# Patient Record
Sex: FEMALE | Race: WHITE
[De-identification: names, ages, dates, MRNs, and addresses within clinical notes are randomized per-mention and may not be internally consistent; named-entity substitution may affect disease eponyms.]

---

## 2017-01-11 ENCOUNTER — HOSPITAL ENCOUNTER (OUTPATIENT)
Dept: HOSPITAL 80 - BRMIMAGING | Age: 72
End: 2017-01-11
Attending: GENERAL ACUTE CARE HOSPITAL
Payer: COMMERCIAL

## 2017-01-11 DIAGNOSIS — Z85.3: ICD-10-CM

## 2017-01-11 DIAGNOSIS — Z12.31: Primary | ICD-10-CM

## 2017-01-11 DIAGNOSIS — Z78.0: ICD-10-CM

## 2017-01-11 DIAGNOSIS — Z13.820: ICD-10-CM

## 2017-01-11 PROCEDURE — G0202 SCR MAMMO BI INCL CAD: HCPCS

## 2017-01-11 NOTE — DX
DEXA Bone Mineral Densitometry    

 

Clinical Indications: 71-year-old female who has a prior history of breast cancer. The patient went t
hrough menopause at age 47, and she takes supplementary vitamin D and exercises by walking and partic
ipating in yoga. Evaluate for estrogen deficiency, and screen for osteoporosis.

 

Comparison: DEXA scan, dated December 5, 2011.

 

Technique:  Bone Mineral Densitometry (BMD) by [employer] Energy X-Ray Absorptiometry (DEXA) was performed 
utilizing the [employer] scanner.  The lumbar spine was evaluated in the AP projection.  Both hips
 and the left forearm were evaluated in the AP projection. Vertebral fracture assessment was also per
formed. A FRAX score was calculated.

 

AP Lumbar Spine:  The L1, L2, and the L3 vertebral bodies were evaluated. The L4 level was excluded b
ecause of some endplate sclerosis.  

BMD:  1.220 gm/cm2 

T-score:  0.3 SD 

Z-score: 2.2 SD

There has been a statistically significant interval decrease in bone mineral density of 5.6% since th
e previous study.

 

AP Left Hip: Femoral Neck

BMD:  0.695 gm/cm2

T-score:  -2.5 SD 

Z-score:  -0.6 SD

There has been a statistically significant interval decrease in bone mineral density of 5.9% since th
e previous study.

 

AP Right Hip: Femoral Neck

BMD:  0.682 gm/cm2

T-score:  -2.6 SD* 

Z-score:  -0.7 SD

There has been a statistically significant interval decrease in bone mineral density of 4.6% since th
e previous study.

 

AP Left Forearm, 1/3:  

BMD:  0.689 gm/cm2

T-score:  -2.1 SD

Z-score:  -0.4 SD 

There has been no statistically significant interval change.

 

Vertebral Fracture Assessment: There is no new significant fracture deformity. There are old mild shawna
tral wedging from T12 to L4 with ventral traction osteophytes.

 

FRAX Score: The 10 year probability for any major osteoporotic fracture is 22.7%, and for a hip fract
ure is 7.8%.

 

Conclusion: Considering the lowest measured site, the patient is osteoporotic*. There have been stati
stically significant interval decreases in bone mineral density involving the lumbar spine and the hi
ps since 2011.

 

Any bone loss in this patient is probably related to aging or estrogen deficiency.

 

Recommendations: 

 

1. Consider excluding secondary metabolic causes of bone loss (reported to be present in as many as 3
0% of patients with normal Z scores). Basic laboratory evaluation might include blood chemistries (ca
lcium, phosphorus, alkaline phosphatase, liver function tests, creatinine, total protein), complete b
lood count,  serum 25-OH- vitamin D3 level,  24-hour urine calcium,  serum TSH and serum PTH. Targete
d laboratory testing based on individual patient circumstances might include serum electrophoresis (S
PEP or UPEP),  anti-tissue transglutaminase antibody levels (celiac disease) , serum bone specific al
kaline phosphatase, bone turnover markers (urine, serum) or fibroblast growth factor 23 (FGF 23)(eval
uate for unexplained osteomalacia). 

 

2. If secondary causes are excluded, then consider initiating treatment with a bisphosphonate (such a
s Fosamax, Actonel, or Boniva). If the patient is unable to use an oral bisphosphonate, another agent
 such as IV bisphosphonates (Boniva or Reclast), teriparatide (Forteo),  a selective estrogen recepto
r modulator (Evista), or denosumab ( anti RANKL monoclonal antibody) might be considered.

 

3. If antiresorptive therapy is initiated and if clinically indicated, consider obtaining a baseline 
and 3 month followup bone resorption marker (NTX, CTX, TRAP5b or Pyridinoline, deoxypyridinoline) to 
monitor the therapeutic effect.  

 

4. Supplementing an insufficient diet to achieve total intakes of 1500 mg calcium and 800 Internation
al Units of vitamin D daily should be considered. 

 

5. Osteoporosis prevention and treatment begins by modifying risk factors. The patient should be enco
uraged to continue participating in a regular exercise program that includes weightbearing and muscle
 strengthening regimens, as is clinically appropriate.

 

6. Recommend follow-up DEXA in one year to assess the efficacy of pharmacologic intervention and/or c
orrection of appropriate secondary cause(s).

## 2017-01-11 NOTE — MA
Screening Digital Mammogram

 

Clinical Indications: Routine screening. History of DCIS in the left breast.

 

Technique:  Standard cephalocaudal and mediolateral oblique projections are obtained.  This examinati
on was processed by the [employer] computer-aided detection system.

 

Comparison: Mammograms December 18, 2015, December 17, 2014, December 11, 2013, June 17, 2013, [facility]
er 13, 2012, June 29, 2012, January 4, 2012, December 12, 2011, December 5, 2011.

 

Breast density: Type C: The breast tissue is heterogeneously dense, which may obscure small masses.

 

Findings: CAD was reviewed.  No suspicious calcifications, masses, or areas of architectural distorti
on are identified.  Heterogeneously dense breast parenchyma reduces sensitivity for noncalcified mass
es.

 

Impression: Negative mammogram. BI-RADS 1.

 

Recommendation:  Routine screening is recommended in one year, as long as physical examination is leanna
ign in this patient with moderately dense breast parenchyma.

 

[facility] will send a result letter to the patient.

 

Negative mammography should not preclude additional workup of a clinically suspicious finding.

 

The patient's information is entered into a reminder system with a target due date for her next mammo
gram.

## 2018-01-12 ENCOUNTER — HOSPITAL ENCOUNTER (OUTPATIENT)
Dept: HOSPITAL 80 - BRMIMAGING | Age: 73
End: 2018-01-12
Attending: GENERAL ACUTE CARE HOSPITAL
Payer: COMMERCIAL

## 2018-01-12 DIAGNOSIS — Z85.3: ICD-10-CM

## 2018-01-12 DIAGNOSIS — Z12.31: Primary | ICD-10-CM

## 2019-01-14 ENCOUNTER — HOSPITAL ENCOUNTER (OUTPATIENT)
Dept: HOSPITAL 80 - BRMIMAGING | Age: 74
End: 2019-01-14
Attending: GENERAL ACUTE CARE HOSPITAL
Payer: COMMERCIAL

## 2019-01-14 DIAGNOSIS — Z85.3: ICD-10-CM

## 2019-01-14 DIAGNOSIS — Z13.820: ICD-10-CM

## 2019-01-14 DIAGNOSIS — M81.0: ICD-10-CM

## 2019-01-14 DIAGNOSIS — Z12.31: Primary | ICD-10-CM

## 2019-01-14 DIAGNOSIS — Z78.0: ICD-10-CM
